# Patient Record
Sex: MALE | Race: WHITE
[De-identification: names, ages, dates, MRNs, and addresses within clinical notes are randomized per-mention and may not be internally consistent; named-entity substitution may affect disease eponyms.]

---

## 2018-03-26 ENCOUNTER — HOSPITAL ENCOUNTER (EMERGENCY)
Dept: HOSPITAL 17 - NEPJ | Age: 53
LOS: 1 days | Discharge: HOME | End: 2018-03-27
Payer: OTHER GOVERNMENT

## 2018-03-26 VITALS
SYSTOLIC BLOOD PRESSURE: 142 MMHG | RESPIRATION RATE: 18 BRPM | OXYGEN SATURATION: 97 % | DIASTOLIC BLOOD PRESSURE: 78 MMHG | TEMPERATURE: 98.9 F | HEART RATE: 97 BPM

## 2018-03-26 VITALS — WEIGHT: 224.87 LBS | BODY MASS INDEX: 30.46 KG/M2 | HEIGHT: 72 IN

## 2018-03-26 VITALS
SYSTOLIC BLOOD PRESSURE: 177 MMHG | RESPIRATION RATE: 18 BRPM | OXYGEN SATURATION: 96 % | HEART RATE: 100 BPM | DIASTOLIC BLOOD PRESSURE: 96 MMHG

## 2018-03-26 DIAGNOSIS — F12.90: ICD-10-CM

## 2018-03-26 DIAGNOSIS — Z72.0: ICD-10-CM

## 2018-03-26 DIAGNOSIS — I10: ICD-10-CM

## 2018-03-26 DIAGNOSIS — R07.9: ICD-10-CM

## 2018-03-26 DIAGNOSIS — F43.24: Primary | ICD-10-CM

## 2018-03-26 DIAGNOSIS — I25.10: ICD-10-CM

## 2018-03-26 DIAGNOSIS — Z79.02: ICD-10-CM

## 2018-03-26 DIAGNOSIS — Z79.899: ICD-10-CM

## 2018-03-26 LAB
ALBUMIN SERPL-MCNC: 4.3 GM/DL (ref 3.4–5)
ALP SERPL-CCNC: 55 U/L (ref 45–117)
ALT SERPL-CCNC: 25 U/L (ref 12–78)
AST SERPL-CCNC: 16 U/L (ref 15–37)
BASOPHILS # BLD AUTO: 0.1 TH/MM3 (ref 0–0.2)
BASOPHILS NFR BLD: 0.5 % (ref 0–2)
BILIRUB SERPL-MCNC: 0.5 MG/DL (ref 0.2–1)
BUN SERPL-MCNC: 6 MG/DL (ref 7–18)
CALCIUM SERPL-MCNC: 9.3 MG/DL (ref 8.5–10.1)
CHLORIDE SERPL-SCNC: 106 MEQ/L (ref 98–107)
CREAT SERPL-MCNC: 0.82 MG/DL (ref 0.6–1.3)
EOSINOPHIL # BLD: 0 TH/MM3 (ref 0–0.4)
EOSINOPHIL NFR BLD: 0 % (ref 0–4)
ERYTHROCYTE [DISTWIDTH] IN BLOOD BY AUTOMATED COUNT: 14.1 % (ref 11.6–17.2)
GFR SERPLBLD BASED ON 1.73 SQ M-ARVRAT: 99 ML/MIN (ref 89–?)
GLUCOSE SERPL-MCNC: 112 MG/DL (ref 74–106)
HCO3 BLD-SCNC: 25.5 MEQ/L (ref 21–32)
HCT VFR BLD CALC: 43.1 % (ref 39–51)
HGB BLD-MCNC: 14.8 GM/DL (ref 13–17)
INR PPP: 1 RATIO
LYMPHOCYTES # BLD AUTO: 2.2 TH/MM3 (ref 1–4.8)
LYMPHOCYTES NFR BLD AUTO: 23.3 % (ref 9–44)
MAGNESIUM SERPL-MCNC: 2.1 MG/DL (ref 1.5–2.5)
MCH RBC QN AUTO: 30.2 PG (ref 27–34)
MCHC RBC AUTO-ENTMCNC: 34.4 % (ref 32–36)
MCV RBC AUTO: 87.8 FL (ref 80–100)
MONOCYTE #: 0.6 TH/MM3 (ref 0–0.9)
MONOCYTES NFR BLD: 5.9 % (ref 0–8)
NEUTROPHILS # BLD AUTO: 6.7 TH/MM3 (ref 1.8–7.7)
NEUTROPHILS NFR BLD AUTO: 70.3 % (ref 16–70)
PLATELET # BLD: 335 TH/MM3 (ref 150–450)
PMV BLD AUTO: 6.9 FL (ref 7–11)
PROT SERPL-MCNC: 7.5 GM/DL (ref 6.4–8.2)
PROTHROMBIN TIME: 10.4 SEC (ref 9.8–11.6)
RBC # BLD AUTO: 4.91 MIL/MM3 (ref 4.5–5.9)
SODIUM SERPL-SCNC: 140 MEQ/L (ref 136–145)
TROPONIN I SERPL-MCNC: (no result) NG/ML (ref 0.02–0.05)
WBC # BLD AUTO: 9.6 TH/MM3 (ref 4–11)

## 2018-03-26 PROCEDURE — 93005 ELECTROCARDIOGRAM TRACING: CPT

## 2018-03-26 PROCEDURE — 85730 THROMBOPLASTIN TIME PARTIAL: CPT

## 2018-03-26 PROCEDURE — 84484 ASSAY OF TROPONIN QUANT: CPT

## 2018-03-26 PROCEDURE — 85610 PROTHROMBIN TIME: CPT

## 2018-03-26 PROCEDURE — 80053 COMPREHEN METABOLIC PANEL: CPT

## 2018-03-26 PROCEDURE — 80307 DRUG TEST PRSMV CHEM ANLYZR: CPT

## 2018-03-26 PROCEDURE — 84443 ASSAY THYROID STIM HORMONE: CPT

## 2018-03-26 PROCEDURE — 85025 COMPLETE CBC W/AUTO DIFF WBC: CPT

## 2018-03-26 PROCEDURE — 71046 X-RAY EXAM CHEST 2 VIEWS: CPT

## 2018-03-26 PROCEDURE — 82552 ASSAY OF CPK IN BLOOD: CPT

## 2018-03-26 PROCEDURE — 83735 ASSAY OF MAGNESIUM: CPT

## 2018-03-26 PROCEDURE — 82550 ASSAY OF CK (CPK): CPT

## 2018-03-26 PROCEDURE — 99285 EMERGENCY DEPT VISIT HI MDM: CPT
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
## 2018-03-26 NOTE — EKG
Date Performed: 03/26/2018       Time Performed: 12:58:30

 

PTAGE:      52 years

 

EKG:      Sinus rhythm 

 

 NORMAL ECG WARNING: DATA QUALITY MAY AFFECT INTERPRETATION 

 

NO PREVIOUS TRACING            

 

DOCTOR:   Julien Nguyen  Interpretating Date/Time  03/26/2018 23:56:23

## 2018-03-26 NOTE — RADRPT
EXAM DATE/TIME:  03/26/2018 13:47 

 

[facility] COMPARISON:     

No previous studies available for comparison.

 

                     

INDICATIONS :     

Chest Pain

                     

 

MEDICAL HISTORY :     

Myocardial infarction.          

 

SURGICAL HISTORY :        

Cardiac stents, Heart Cath

 

ENCOUNTER:     

Initial                                        

 

ACUITY:     

1 day      

 

PAIN SCORE:     

0/10

 

LOCATION:      

chest 

 

FINDINGS:     

PA and lateral views of the chest demonstrate the lungs to be symmetrically aerated without evidence 
of mass, infiltrate or effusion.  The cardiomediastinal contours are unremarkable.  Osseous structure
s are intact.

 

CONCLUSION:     No acute disease.  

 

 

 

 Victorino Perez MD on March 26, 2018 at 14:08           

Board Certified Radiologist.

 This report was verified electronically.

## 2018-03-27 VITALS
RESPIRATION RATE: 19 BRPM | OXYGEN SATURATION: 98 % | DIASTOLIC BLOOD PRESSURE: 86 MMHG | HEART RATE: 70 BPM | SYSTOLIC BLOOD PRESSURE: 149 MMHG

## 2018-03-27 NOTE — PD
Physical Exam


Time Seen by Provider:  09:34


Narrative


Dr. Keenan has evaluated patient, lifted [place] act and cleared the patient 

for discharge.





Data


Data


Last Documented VS





Vital Signs








  Date Time  Temp Pulse Resp B/P (MAP) Pulse Ox O2 Delivery O2 Flow Rate FiO2


 


3/27/18 02:17  70 19 149/86 (107) 98 Room Air  


 


3/26/18 13:04 98.9       








Orders





 Orders


Electrocardiogram (3/26/18 13:00)


Ckmb (Isoenzyme) Profile (3/26/18 13:00)


Complete Blood Count With Diff (3/26/18 13:00)


Comprehensive Metabolic Panel (3/26/18 13:00)


Magnesium (Mg) (3/26/18 13:00)


Prothrombin Time / Inr (Pt) (3/26/18 13:00)


Act Partial Throm Time (Ptt) (3/26/18 13:00)


Troponin I (3/26/18 13:00)


Iv Access Insert/Monitor (3/26/18 13:00)


Chest, Pa & Lat (3/26/18 13:00)


Thyroid Stimulating Hormone (3/26/18 13:00)


Drug Screen, Random Urine (3/26/18 13:00)


Alcohol (Ethanol) (3/26/18 13:00)


Psych Screen (3/26/18 13:00)


CKMB (3/26/18 13:12)


CKMB% (3/26/18 13:12)


Diet Regular Basic (3/26/18 Dinner)


Troponin I (3/26/18 18:35)


Electrocardiogram (3/26/18 )


Nitroglycerin Sl (Nitrostat Sl) (3/26/18 19:15)


Ibuprofen (Motrin) (3/26/18 19:45)


Amlodipine (Norvasc) (3/26/18 20:00)


Atenolol (Tenormin) (3/26/18 20:00)


Lisinopril (Prinivil) (3/26/18 20:00)


Sertraline (Zoloft) (3/26/18 20:00)


Diet Regular Basic (3/27/18 Breakfast)





Labs





Laboratory Tests








Test


  3/26/18


13:12 3/26/18


15:40 3/26/18


19:26


 


White Blood Count 9.6 TH/MM3   


 


Red Blood Count 4.91 MIL/MM3   


 


Hemoglobin 14.8 GM/DL   


 


Hematocrit 43.1 %   


 


Mean Corpuscular Volume 87.8 FL   


 


Mean Corpuscular Hemoglobin 30.2 PG   


 


Mean Corpuscular Hemoglobin


Concent 34.4 % 


  


  


 


 


Red Cell Distribution Width 14.1 %   


 


Platelet Count 335 TH/MM3   


 


Mean Platelet Volume 6.9 FL   


 


Neutrophils (%) (Auto) 70.3 %   


 


Lymphocytes (%) (Auto) 23.3 %   


 


Monocytes (%) (Auto) 5.9 %   


 


Eosinophils (%) (Auto) 0.0 %   


 


Basophils (%) (Auto) 0.5 %   


 


Neutrophils # (Auto) 6.7 TH/MM3   


 


Lymphocytes # (Auto) 2.2 TH/MM3   


 


Monocytes # (Auto) 0.6 TH/MM3   


 


Eosinophils # (Auto) 0.0 TH/MM3   


 


Basophils # (Auto) 0.1 TH/MM3   


 


CBC Comment DIFF FINAL   


 


Differential Comment    


 


Prothrombin Time 10.4 SEC   


 


Prothromb Time International


Ratio 1.0 RATIO 


  


  


 


 


Activated Partial


Thromboplast Time 25.7 SEC 


  


  


 


 


Blood Urea Nitrogen 6 MG/DL   


 


Creatinine 0.82 MG/DL   


 


Random Glucose 112 MG/DL   


 


Total Protein 7.5 GM/DL   


 


Albumin 4.3 GM/DL   


 


Calcium Level 9.3 MG/DL   


 


Magnesium Level 2.1 MG/DL   


 


Alkaline Phosphatase 55 U/L   


 


Aspartate Amino Transf


(AST/SGOT) 16 U/L 


  


  


 


 


Alanine Aminotransferase


(ALT/SGPT) 25 U/L 


  


  


 


 


Total Bilirubin 0.5 MG/DL   


 


Sodium Level 140 MEQ/L   


 


Potassium Level 3.7 MEQ/L   


 


Chloride Level 106 MEQ/L   


 


Carbon Dioxide Level 25.5 MEQ/L   


 


Anion Gap 9 MEQ/L   


 


Estimat Glomerular Filtration


Rate 99 ML/MIN 


  


  


 


 


Total Creatine Kinase 200 U/L   


 


Creatine Kinase MB 1.9 NG/ML   


 


Troponin I


  LESS THAN 0.02


NG/ML 


  LESS THAN 0.02


NG/ML


 


Thyroid Stimulating Hormone


3rd Gen 0.561 uIU/ML 


  


  


 


 


Ethyl Alcohol Level


  LESS THAN 3


MG/DL 


  


 


 


Urine Opiates Screen  NEG  


 


Urine Barbiturates Screen  NEG  


 


Urine Amphetamines Screen  NEG  


 


Urine Benzodiazepines Screen  NEG  


 


Urine Cocaine Screen  NEG  


 


Urine Cannabinoids Screen  POS  











MDM


Supervised Visit with [facility]:  No


Narrative Course


Dr. Keenan has evaluated patient, lifted [place] act and cleared the patient 

for discharge.  Patient contracts safety.  Denies suicidal or homicidal 

ideations.  Patient will be provided community resource packet to [facility] for 

follow-up.  Has friends and family for support.  Patient was medically cleared 

by alternate provider prior to psych screening.  Patient has been evaluated by 

psychiatry and and is now cleared for discharge.


Diagnosis





 Primary Impression:  


 Adjustment disorder with disturbance of conduct


Referrals:  


[facility] (Out patient)





[facility]





Primary Care Physician





Psychiatrist





[facility]


Patient Instructions:  General Instructions, Mood Disorders (ED)


Departure Forms:  Tests/Procedures





***Additional Instruction:  


Contract safety to your self and others


Follow-up with psychiatry


Follow-up with primary care provider


Follow-up with [facility]


Return to the emergency department immediately with worsening of symptoms


***Med/Other Pt SpecificInfo:  No Change to Meds, No Meds Exist/No RX given


Disposition:  01 DISCHARGE HOME


Condition:  Stable











Jennifer House Mar 27, 2018 09:35

## 2018-03-27 NOTE — EKG
Date Performed: 03/26/2018       Time Performed: 19:12:03

 

PTAGE:      52 years

 

EKG:      Sinus rhythm 

 

 NORMAL ECG Since the previous tracing, no significant change noted 

 

NO PREVIOUS TRACING            

 

DOCTOR:   Jadon Shen  Interpretating Date/Time  03/27/2018 15:41:03
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
